# Patient Record
Sex: FEMALE | Race: WHITE | NOT HISPANIC OR LATINO | Employment: FULL TIME | URBAN - METROPOLITAN AREA
[De-identification: names, ages, dates, MRNs, and addresses within clinical notes are randomized per-mention and may not be internally consistent; named-entity substitution may affect disease eponyms.]

---

## 2021-04-06 ENCOUNTER — OFFICE VISIT (OUTPATIENT)
Dept: VASCULAR SURGERY | Facility: CLINIC | Age: 51
End: 2021-04-06
Payer: COMMERCIAL

## 2021-04-06 DIAGNOSIS — I78.1 TELANGIECTASIA: Primary | ICD-10-CM

## 2021-04-06 PROCEDURE — 99202 OFFICE O/P NEW SF 15 MIN: CPT | Performed by: SURGERY

## 2021-04-06 NOTE — LETTER
4/6/2021         RE: Smitha Hernandez  [address] [address]        Dear Colleague,    Thank you for referring your patient, Smitha Hernandez, to the [facility] [place]. Please see a copy of my visit note below.    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  She states the area on the left foot began swelling about 6 months ago and is slowly grown larger.  She does not recall any trauma or any specific injury to her foot that might of precipitated this.  She has not had anything similar to this in the past.    I last saw her in 2014 for sclerotherapy of telangiectasias and reticular veins of cosmetic concern.  She had excellent results and has done well since that time but returns now with recurrence.  She denies swelling of the legs, superficial thrombophlebitis, deep vein thrombosis.    PAST MEDICAL HISTORY: No past medical history on file.    PAST SURGICAL HISTORY: No past surgical history on file.    FAMILY HISTORY: No family history on file.    SOCIAL HISTORY:   Social History     Tobacco Use     Smoking status: Unknown If Ever Smoked   Substance Use Topics     Alcohol use: Yes       REVIEW OF SYSTEMS: Review Of Systems  Skin: negative  Eyes: negative  Ears/Nose/Throat: negative  Respiratory: No shortness of breath, dyspnea on exertion, cough, or hemoptysis  Cardiovascular: negative  Gastrointestinal: negative  Genitourinary: negative  Musculoskeletal: negative  Neurologic: negative  Psychiatric: negative  Hematologic/Lymphatic/Immunologic: Easy bruising  Endocrine: hot flashes      Vital signs:  There were no vitals taken for this visit.    Current Outpatient Medications   Medication Sig Dispense Refill     Sertraline HCl (ZOLOFT PO)  Take by mouth daily         PHYSICAL EXAM:  General: Pleasant, NAD.   HEENT: Normocephalic, atraumatic, external ears and nose normal.   Respiratory: Normal respiratory effort.   Cardiovascular: Pulse is regular.   Musculoskeletal: Gait and station normal.  The joints of her fingers and toes without deformity.  There is no cyanosis of her nailbeds.   EXTREMITIES: Right lower extremity: Scattered telangiectasias and reticular veins on the proximal anteromedial right calf.  No varicose veins.  There are few scattered telangiectasias elsewhere on her right leg.  No right ankle edema, stasis changes.  Left lower extremity: A few scattered telangiectasias and reticular veins.  No varicose veins.  No edema.  On the medial aspect of her left foot near the instep is a 1 cm diameter, raised, fixed subcutaneous mass with a 2 to 3 mm vein overlying it.  This has a suggestion of a fluid-filled cyst, possibly a ganglion cyst.  No erythema or other inflammation.    PULSES: R/L (3=normal pulse, 0=no palpable pulse) dorsalis pedis: 3/3; posterior tibial: 3/3.      Neurologic: Grossly normal  Psychiatric: Mood, affect, judgment and insight are normal     ASSESSMENT:  1.  Left medial foot mass, either a venous structure or a ganglion cyst.  We will check a left lower extremity ultrasound to ensure that there is no venous  insufficiency contributing to this mass.  2.  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  She understands that these are of little to no medical risk and that observation is a good option.  She wishes to have these treated for cosmetic purposes.    Details of cosmetic sclerotherapy including risks of allergic reaction, deep vein thrombosis, ulceration, hyperpigmentation and matting were discussed.  She voiced understanding and wishes to proceed.    PLAN:  1.  Left lower extremity venous competency study to include the left foot  2.  Bilateral lower extremity  cosmetic sclerotherapy, the first session under ultrasound guidance     Bhavik Lin MD    Dictated using Dragon voice recognition software which may result in transcription errors            Again, thank you for allowing me to participate in the care of your patient.        Sincerely,        Bhavik Lin MD

## 2021-04-06 NOTE — PROGRESS NOTES
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  She states the area on the left foot began swelling about 6 months ago and is slowly grown larger.  She does not recall any trauma or any specific injury to her foot that might of precipitated this.  She has not had anything similar to this in the past.    I last saw her in 2014 for sclerotherapy of telangiectasias and reticular veins of cosmetic concern.  She had excellent results and has done well since that time but returns now with recurrence.  She denies swelling of the legs, superficial thrombophlebitis, deep vein thrombosis.    PAST MEDICAL HISTORY: No past medical history on file.    PAST SURGICAL HISTORY: No past surgical history on file.    FAMILY HISTORY: No family history on file.    SOCIAL HISTORY:   Social History     Tobacco Use     Smoking status: Unknown If Ever Smoked   Substance Use Topics     Alcohol use: Yes       REVIEW OF SYSTEMS: Review Of Systems  Skin: negative  Eyes: negative  Ears/Nose/Throat: negative  Respiratory: No shortness of breath, dyspnea on exertion, cough, or hemoptysis  Cardiovascular: negative  Gastrointestinal: negative  Genitourinary: negative  Musculoskeletal: negative  Neurologic: negative  Psychiatric: negative  Hematologic/Lymphatic/Immunologic: Easy bruising  Endocrine: hot flashes      Vital signs:  There were no vitals taken for this visit.    Current Outpatient Medications   Medication Sig Dispense Refill     Sertraline HCl (ZOLOFT PO) Take by mouth daily         PHYSICAL EXAM:  General: Pleasant, NAD.   HEENT: Normocephalic, atraumatic, external ears and nose normal.   Respiratory: Normal respiratory effort.   Cardiovascular: Pulse is regular.   Musculoskeletal: Gait and station normal.  The  joints of her fingers and toes without deformity.  There is no cyanosis of her nailbeds.   EXTREMITIES: Right lower extremity: Scattered telangiectasias and reticular veins on the proximal anteromedial right calf.  No varicose veins.  There are few scattered telangiectasias elsewhere on her right leg.  No right ankle edema, stasis changes.  Left lower extremity: A few scattered telangiectasias and reticular veins.  No varicose veins.  No edema.  On the medial aspect of her left foot near the instep is a 1 cm diameter, raised, fixed subcutaneous mass with a 2 to 3 mm vein overlying it.  This has a suggestion of a fluid-filled cyst, possibly a ganglion cyst.  No erythema or other inflammation.    PULSES: R/L (3=normal pulse, 0=no palpable pulse) dorsalis pedis: 3/3; posterior tibial: 3/3.      Neurologic: Grossly normal  Psychiatric: Mood, affect, judgment and insight are normal     ASSESSMENT:  1.  Left medial foot mass, either a venous structure or a ganglion cyst.  We will check a left lower extremity ultrasound to ensure that there is no venous  insufficiency contributing to this mass.  2.  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  She understands that these are of little to no medical risk and that observation is a good option.  She wishes to have these treated for cosmetic purposes.    Details of cosmetic sclerotherapy including risks of allergic reaction, deep vein thrombosis, ulceration, hyperpigmentation and matting were discussed.  She voiced understanding and wishes to proceed.    PLAN:  1.  Left lower extremity venous competency study to include the left foot  2.  Bilateral lower extremity cosmetic sclerotherapy, the first session under ultrasound guidance     Bhavik Lin MD    Dictated using Dragon voice recognition software which may result in transcription errors

## 2021-04-06 NOTE — PROGRESS NOTES
After Visit Follow Up  Per Dr. Lin, patient was recommended to have 2 - 3 sessions at $400 of cosmetic sclerotherapy. 1st session Ultrasound Guided.    Patient in agreement with plan and had no further questions.    Emily Ward on 4/6/2021 at 12:12 PM

## 2021-04-19 ENCOUNTER — OFFICE VISIT (OUTPATIENT)
Dept: VASCULAR SURGERY | Facility: CLINIC | Age: 51
End: 2021-04-19
Payer: COMMERCIAL

## 2021-04-19 ENCOUNTER — ANCILLARY PROCEDURE (OUTPATIENT)
Dept: ULTRASOUND IMAGING | Facility: CLINIC | Age: 51
End: 2021-04-19
Attending: SURGERY
Payer: COMMERCIAL

## 2021-04-19 DIAGNOSIS — I78.1 TELANGIECTASIA: ICD-10-CM

## 2021-04-19 DIAGNOSIS — I78.1 SPIDER TELANGIECTASIS OF SKIN: Primary | ICD-10-CM

## 2021-04-19 DIAGNOSIS — I78.1 SPIDER VEINS: Primary | ICD-10-CM

## 2021-04-19 PROCEDURE — A6533 GC STOCKING THIGHLNGTH 18-30: HCPCS | Performed by: SURGERY

## 2021-04-19 PROCEDURE — 93971 EXTREMITY STUDY: CPT | Mod: LT | Performed by: SURGERY

## 2021-04-19 PROCEDURE — 99213 OFFICE O/P EST LOW 20 MIN: CPT | Performed by: SURGERY

## 2021-04-19 PROCEDURE — 99207 PR DROP WITH A PROCEDURE: CPT | Performed by: SURGERY

## 2021-04-19 PROCEDURE — 36468 NJX SCLRSNT SPIDER VEINS: CPT | Performed by: SURGERY

## 2021-04-19 PROCEDURE — 99207 PR NO CHARGE NURSE ONLY: CPT

## 2021-04-19 NOTE — PROGRESS NOTES
Patient purchased 1 pair(s) of beige, thigh high, open-toe, size 3 compression hose from the clinic today.     Informed patient all compression hose purchases are final.    Jennifer Fallon MA on 4/19/2021 at 8:16 AM

## 2021-04-19 NOTE — LETTER
4/19/2021         RE: Smitha Hernandez  [address] [address]        Dear Colleague,    Thank you for referring your patient, Smitha Hernandez, to the [facility] [place]. Please see a copy of my visit note below.    [facility] [place] Progress Note    Smitha Hernandez presents in follow-up of left lower extremity pain, primarily on her medial foot and the location of a mass and recurrent bilateral lower extremity reticular veins and telangiectasias.  Please see my consultation of 4/6/2021 for details.  She returned today for a left lower extremity venous competency study and discussion of results.    Physical Exam  General: Pleasant female in no acute distress.  Blood pressure 130/87, pulse 67  Extremities: Right lower extremity: Scattered reticular veins and telangiectasias especially over the right proximal medial and anteromedial leg.  Also intense telangiectasias are noted over the proximal posterior lateral right leg extending into the popliteal fossa and distal posterior lateral right thigh.  No significant varicose veins, edema or stasis changes.  Left lower extremity: Clusters of telangiectasias and reticular veins especially over the medial proximal calf, anterolateral left leg and near the left medial ankle.  There is a 15 to 18 mm mass on the left medial foot/instep which is noncompressible, nonecchymotic and nonerythematous.  It is slightly tender.  No edema or stasis changes of her ankle.    Ultrasound:  Right lower extremity: Normal phasicity, compression and augmentation with no evidence of deep vein thrombosis.  Left lower extremity: No evidence of deep vein thrombosis.  The left common femoral vein is incompetent but the remaining deep vein valves are competent.  Left great saphenous vein is incompetent from the saphenofemoral junction to the ankle, measures 5 mm in diameter distally with reflux time of 2.5  seconds.  There is a small varicosity coursing from the left distal great saphenous vein near the ankle.  There is in a nonvascular, noncompressible fluid-filled mass on the medial left foot/instep.    Assessment:  The mass of the left foot is not related to a vein.  This is cystic and evidence when I imaged this with ultrasound myself.  This may represent a ganglion cyst but is not a venous structure.  The mass is not related to her left great saphenous vein incompetence.    She is asymptomatic from the standpoint of her saphenous vein incompetence with no significant leg pain or varicose veins, therefore I feel the saphenous incompetence should be observed at this time.  If she develops symptoms of heaviness, achiness, tiredness or develop significant varicose veins, this could be treated.  We discussed the risk of observation with superficial thrombophlebitis, bleeding and progression of the disease process    I will recommend the patient see an orthopedic foot specialist, Dr. Matias to have this evaluated.    The patient may wish to have treatment of her telangiectasias and reticular veins for cosmetic purposes.    Plan:  1.  Orthopedic surgery evaluation for left medial foot mass  2.  Bilateral lower extremity cosmetic sclerotherapy    Bhavik Lin MD    Dictated using Dragon voice recognition software which may result in transcription errors      .    Vein Clinic Sclerotherapy Note     Indications:  Bilateral lower extremity reticular veins and spider telangiectasias of cosmetic concern     Procedure:  1.  Ultrasound-guided cosmetic sclerotherapy right proximal medial calf subcutaneous vein  2.  Bilateral lower extremity cosmetic sclerotherapy     Procedure description  Details of procedure including risks of allergic reaction, deep vein thrombosis, ulceration, superficial thrombophlebitis and hyperpigmentation were discussed.  The patient voiced understanding and wished to proceed.  Informed  consent was obtained.    The patient had a cluster of intense telangiectasias and reticular veins about the medial and anteromedial right proximal calf.  I imaged the area to see if there was a feeding varicosity and indeed there was a 3.5 mm vein coursing deep to this area.  I isolated this with ultrasound, directed a 27-gauge needle into it and injected 1 mL of 1% polidocanol foam (1 part polidocanol to 3 parts air mixture).  I then had the patient perform ankle pumps on the table.    I then donned the Syris headlight and injected telangiectasias and reticular veins from the distal third of the legs up to the mid thighs circumferentially.  The most intense clusters were on the anteromedial right calf and on the proximal posterior lateral right calf extending into the popliteal fossa.  We used a total of 7.5 mL of 0.5% polidocanol foam (1 part polidocanol to 2 parts air mixture).    I had the patient perform ankle pumps on the table.  We cleaned her legs, had her don thigh-high compression and then had her walk for 10 minutes prior to getting the car drive home.  She will return in 6 weeks in follow-up, no ultrasound guidance, 30-minute session    She tolerated procedure well without evidence of lower tract of complications.    Sclerotherapy    Date/Time: 4/19/2021 10:41 AM  Performed by: Bhavik Lin MD  Authorized by: Bhavik Lin MD     Time out: Immediately prior to the procedure a time out was called    Type:  Cosmetic  Session:  Full  Procedure side:  Bilateral  Solution/Amount:  1% POLIDOCANOL  Syringes::  1 syringe 1%; 3 syringes .5%  Patient tolerance:  Patient tolerated the procedure well with no immediate complications  Wrap/Hose:  Perry Lin MD    Dictated using Dragon voice recognition software which may result in transcription errors          Again, thank you for allowing me to participate in the care of your patient.        Sincerely,        Bhavik  Maribeth Lin MD

## 2022-06-01 ENCOUNTER — OFFICE VISIT (OUTPATIENT)
Dept: VASCULAR SURGERY | Facility: CLINIC | Age: 52
End: 2022-06-01

## 2022-06-01 DIAGNOSIS — I78.1 SPIDER TELANGIECTASIS OF SKIN: Primary | ICD-10-CM

## 2022-06-01 PROCEDURE — 36468 NJX SCLRSNT SPIDER VEINS: CPT | Performed by: SURGERY

## 2022-06-01 PROCEDURE — 99207 PR NO CHARGE NURSE ONLY: CPT

## 2022-06-01 PROCEDURE — S9999 SALES TAX: HCPCS | Performed by: SURGERY

## 2022-06-01 PROCEDURE — A6533 GC STOCKING THIGHLNGTH 18-30: HCPCS

## 2022-06-01 NOTE — PROGRESS NOTES
Patient purchased 1 pair(s) of natural, thigh high, closed-toe, size 3 compression hose from the clinic today.     Informed patient all compression hose purchases are final.    ZACARIAS Baxter on 6/1/2022 at 11:45 AM

## 2022-06-01 NOTE — LETTER
6/1/2022         RE: Smitha Hernandez  [address] [address]        Dear Colleague,    Thank you for referring your patient, Smitha Hernandez, to the [facility] [place]. Please see a copy of my visit note below.        Vein Clinic Sclerotherapy Note     Indications:  Recurrent, bilateral extremity spider telangiectasias and reticular veins of cosmetic concern     Procedure:  Bilateral lower extremity cosmetic sclerotherapy     Procedure description  Details of procedure including risks of allergic reaction, deep vein thrombosis, ulceration, superficial thrombophlebitis and hyperpigmentation were discussed.  The patient voiced understanding and wished to proceed.  Informed consent was obtained.    This patient was last in our office in April 2021 with concerns of a mass on the left foot/instep.  An ultrasound revealed this to be nonvascular.  She never had this further evaluated and is not complaining of pain.    She presents at this time with spider telangiectasias which are most proliferative over the medial right calf extending somewhat anteriorly.  Patient also has noted a small, slightly bulging vein on the right proximal medial calf.  She is not having significant pain.    We discussed the lower extremity vein anatomy and the possibility that she could have underlying right great saphenous vein incompetence as a contributing factor.  She is asymptomatic and does not wish to have an ultrasound to evaluate this at this time, H which with which I concur.  She does wish to have the telangiectasias and other veins treated for cosmetic purposes.    I donned the Syris headlight and injected telangiectasias circumferentially on both lower extremities using 0.5% polidocanol foam, total of 4 syringes (12 mL).   the small varicosity on the proximal medial calf is also injected.    I had the patient perform ankle pumps.  We cleaned her legs, had her don compression, then  had her walk for 10 minutes.  She will return in about 6 weeks in follow-up.      She is to have a possible right meniscectomy in about 3 weeks.  I do not think the treatment today would place her at significant increased risk for for thromboembolic complications with that surgery.    Sclerotherapy    Date/Time: 6/1/2022 1:18 PM  Performed by: Bhavik Lin MD  Authorized by: Bhavik Lin MD     Time out: Immediately prior to the procedure a time out was called    Type:  Cosmetic  Session:  Full  Procedure side:  Bilateral  Solution/Amount:  .5 POLIDOCANOL  Syringes:  4  Patient tolerance:  Patient tolerated the procedure well with no immediate complications  Wrap/Hose:  Eufemiae        ROMEO Lin MD    Dictated using Dragon voice recognition software which may result in transcription errors      Again, thank you for allowing me to participate in the care of your patient.        Sincerely,        Bhaivk Lin MD

## 2022-06-01 NOTE — PROGRESS NOTES
Vein Clinic Sclerotherapy Note     Indications:  Recurrent, bilateral extremity spider telangiectasias and reticular veins of cosmetic concern     Procedure:  Bilateral lower extremity cosmetic sclerotherapy     Procedure description  Details of procedure including risks of allergic reaction, deep vein thrombosis, ulceration, superficial thrombophlebitis and hyperpigmentation were discussed.  The patient voiced understanding and wished to proceed.  Informed consent was obtained.    This patient was last in our office in April 2021 with concerns of a mass on the left foot/instep.  An ultrasound revealed this to be nonvascular.  She never had this further evaluated and is not complaining of pain.    She presents at this time with spider telangiectasias which are most proliferative over the medial right calf extending somewhat anteriorly.  Patient also has noted a small, slightly bulging vein on the right proximal medial calf.  She is not having significant pain.    We discussed the lower extremity vein anatomy and the possibility that she could have underlying right great saphenous vein incompetence as a contributing factor.  She is asymptomatic and does not wish to have an ultrasound to evaluate this at this time, H which with which I concur.  She does wish to have the telangiectasias and other veins treated for cosmetic purposes.    I donned the Syris headlight and injected telangiectasias circumferentially on both lower extremities using 0.5% polidocanol foam, total of 4 syringes (12 mL).   the small varicosity on the proximal medial calf is also injected.    I had the patient perform ankle pumps.  We cleaned her legs, had her don compression, then had her walk for 10 minutes.  She will return in about 6 weeks in follow-up.      She is to have a possible right meniscectomy in about 3 weeks.  I do not think the treatment today would place her at significant increased risk for for thromboembolic complications  with that surgery.    Sclerotherapy    Date/Time: 6/1/2022 1:18 PM  Performed by: Bhavik Lin MD  Authorized by: Bhavik Lin MD     Time out: Immediately prior to the procedure a time out was called    Type:  Cosmetic  Session:  Full  Procedure side:  Bilateral  Solution/Amount:  .5 POLIDOCANOL  Syringes:  4  Patient tolerance:  Patient tolerated the procedure well with no immediate complications  Wrap/Hose:  Perry Lin MD    Dictated using Dragon voice recognition software which may result in transcription errors

## 2022-07-13 ENCOUNTER — TELEPHONE (OUTPATIENT)
Dept: VASCULAR SURGERY | Facility: CLINIC | Age: 52
End: 2022-07-13

## 2022-07-13 NOTE — TELEPHONE ENCOUNTER
LM for pt to give us a call back regarding the type of procedure she is having done.    Ginger Bourgeois RN  [facility]  Vein [facility]

## 2022-07-13 NOTE — TELEPHONE ENCOUNTER
Pt is having a non invasive procedure the next day following vein inj tx, pt wants to know if it is ok to have sclero done prior.

## 2022-08-16 ENCOUNTER — OFFICE VISIT (OUTPATIENT)
Dept: VASCULAR SURGERY | Facility: CLINIC | Age: 52
End: 2022-08-16

## 2022-08-16 DIAGNOSIS — I78.1 SPIDER TELANGIECTASIS OF SKIN: Primary | ICD-10-CM

## 2022-08-16 PROCEDURE — S9999 SALES TAX: HCPCS | Performed by: SURGERY

## 2022-08-16 PROCEDURE — 36468 NJX SCLRSNT SPIDER VEINS: CPT | Performed by: SURGERY

## 2022-08-16 NOTE — LETTER
8/16/2022         RE: Smitha Hernandez  [address] [facility] [address]        Dear Colleague,    Thank you for referring your patient, Smitha Hernandez, to the [facility] [place]. Please see a copy of my visit note below.        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  The patient voiced understanding and wished to proceed.  Informed consent was obtained.    The patient was last seen for cosmetic sclerotherapy on 6/1/2022.  She feels that she got fairly good results but has residual telangiectasias on the anterior right leg extending somewhat medially and laterally with fewer on the left anterior leg.  There is an area of trapped blood on the right proximal medial leg but no other areas of trapped blood were appreciated.    I donned the Syris headlight and treated these residual telangiectasias with 0.5% polidocanol foam using a total of 2 syringes (6 mL).  I had the patient perform ankle pumps.    I  then cleaned the area of trapped blood with alcohol, made a stab wound with an 18-gauge needle and expressed thrombus.  The patient tolerated this well and will return sometime in spring.      Sclerotherapy    Date/Time: 8/16/2022 2:36 PM  Performed by: Bhavik Lin MD  Authorized by: Bhavik Lin MD     Time out: Immediately prior to the procedure a time out was called    Type:  Cosmetic  Session:  Limited  Procedure side:  Bilateral  Solution/Amount:  .5 POLIDOCANOL  Syringes:  2  Evacuation of intraluminal thrombus under sterile conditions without  complications.    Patient tolerance:  Patient tolerated the procedure well with no immediate complications  Wrap/Hose:  Perry Lin MD    Dictated using Dragon voice recognition software which may result in transcription errors      Again, thank you for allowing me to participate in the care of your patient.        Sincerely,        Bhavik Lin MD

## 2022-08-16 NOTE — PROGRESS NOTES
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  The patient voiced understanding and wished to proceed.  Informed consent was obtained.    The patient was last seen for cosmetic sclerotherapy on 6/1/2022.  She feels that she got fairly good results but has residual telangiectasias on the anterior right leg extending somewhat medially and laterally with fewer on the left anterior leg.  There is an area of trapped blood on the right proximal medial leg but no other areas of trapped blood were appreciated.    I donned the Syris headlight and treated these residual telangiectasias with 0.5% polidocanol foam using a total of 2 syringes (6 mL).  I had the patient perform ankle pumps.    I  then cleaned the area of trapped blood with alcohol, made a stab wound with an 18-gauge needle and expressed thrombus.  The patient tolerated this well and will return sometime in spring.      Sclerotherapy    Date/Time: 8/16/2022 2:36 PM  Performed by: Bhavik Lin MD  Authorized by: Bhavik Lin MD     Time out: Immediately prior to the procedure a time out was called    Type:  Cosmetic  Session:  Limited  Procedure side:  Bilateral  Solution/Amount:  .5 POLIDOCANOL  Syringes:  2  Evacuation of intraluminal thrombus under sterile conditions without complications.    Patient tolerance:  Patient tolerated the procedure well with no immediate complications  Wrap/Hose:  Perry Lin MD    Dictated using Dragon voice recognition software which may result in transcription errors

## 2023-02-07 ENCOUNTER — ALLIED HEALTH/NURSE VISIT (OUTPATIENT)
Dept: VASCULAR SURGERY | Facility: CLINIC | Age: 53
End: 2023-02-07
Payer: COMMERCIAL

## 2023-02-07 ENCOUNTER — OFFICE VISIT (OUTPATIENT)
Dept: VASCULAR SURGERY | Facility: CLINIC | Age: 53
End: 2023-02-07
Payer: COMMERCIAL

## 2023-02-07 DIAGNOSIS — I78.1 SPIDER VEINS: Primary | ICD-10-CM

## 2023-02-07 DIAGNOSIS — I78.1 SPIDER TELANGIECTASIS OF SKIN: ICD-10-CM

## 2023-02-07 DIAGNOSIS — I78.1 SPIDER VEINS: ICD-10-CM

## 2023-02-07 DIAGNOSIS — I83.811 VARICOSE VEINS OF RIGHT LOWER EXTREMITY WITH PAIN: Primary | ICD-10-CM

## 2023-02-07 PROCEDURE — 36468 NJX SCLRSNT SPIDER VEINS: CPT | Performed by: SURGERY

## 2023-02-07 PROCEDURE — S9999 SALES TAX: HCPCS | Performed by: SURGERY

## 2023-02-07 PROCEDURE — 99207 PR NO CHARGE NURSE ONLY: CPT

## 2023-02-07 NOTE — PROGRESS NOTES
[facility] [place] office note  Smitha Hernandez returns in follow-up of 2 sessions of sclerotherapy, primarily of the right lower extremity.  She had a varicosity called her proximal, medial right calf and telangiectasias over the anteromedial right calf treated.    Since I have seen her she is complaining of numbness of her right foot and aching of her right lower extremity.  Additionally, the treated veins on the medial calf have not improved.  The pain is located in her foot and in her medial right leg.    Exam  Right lower extremity: Trace edema.  Reticular veins and somewhat matted telangiectasias over the infrapatellar area and over the anteromedial right calf.    Ultrasound  I interrogated the right lower extremity veins and note a sizable, immediate subcutaneous great saphenous vein from the mid to the distal thigh and into the proximal calf with tributaries coursing anterolaterally into the areas of the telangiectasias.  The great saphenous vein is quite generous in size and is likely incompetent.    Assessment  Worsening right leg pain with a dilated right great saphenous vein which is likely incompetent.  This could be contributing to the numbness of her foot and the pain in her right leg.  We discussed obtaining a formal ultrasound to assess for underlying valve incompetence, as a topic we have discussed at previous visits.  The leg is giving her more trouble with now and she would like to have this looked into.    Plan  Right lower extremity venous competency study with video visit to discuss results.    I will treat the varicosities coursing deep to the area of telangiectasias on the right leg with ultrasound-guided cosmetic sclerotherapy today.    ROMEO Lin MD, FACS

## 2023-02-07 NOTE — PROGRESS NOTES
Vein Clinic Sclerotherapy Note     Indications:  Right lower extremity spider telangiectasias of cosmetic concern     Procedure:  Right lower extremity cosmetic sclerotherapy, ultrasound-guided and direct vision     Procedure description  Details of procedure including risks of allergic reaction, deep vein thrombosis, ulceration, superficial thrombophlebitis and hyperpigmentation were discussed.  The patient voiced understanding and wished to proceed.  Informed consent was obtained.    I imaged the right medial calf with ultrasound and noted a trip.  Coursing from the below-knee great saphenous vein coursing laterally, deep to the area of matted telangiectasias.  This was isolated under ultrasound and a 27-gauge needle directed into it and 1 mL of 0.5% polidocanol foam was injected into it, milking it laterally and distally.    I then donned a Syris headlight and injected these telangiectasias under direct vision.  A total of approximately 4.5 mL of foam.    I had the patient perform ankle pumps.  We cleaned her leg, held on compression, then in a walk for 10 minutes.    She will return for right lower extremity venous competency study in the near future, the results which were discussed via a video visit      Solution/Amount:  .5 POLIDOCANOL  Syringes:  2  Patient tolerance:  Patient tolerated the procedure well with no immediate complications  Wrap/Hose:  Perry Lin MD    Dictated using Dragon voice recognition software which may result in transcription errors

## 2023-02-07 NOTE — LETTER
2/7/2023         RE: Smitha Hernandez  [address] [address]        Dear Colleague,    Thank you for referring your patient, Smitha Hernandez, to the [facility] [place]. Please see a copy of my visit note below.    [facility] [place] office note  Smitha Hernandez returns in follow-up of 2 sessions of sclerotherapy, primarily of the right lower extremity.  She had a varicosity called her proximal, medial right calf and telangiectasias over the anteromedial right calf treated.    Since I have seen her she is complaining of numbness of her right foot and aching of her right lower extremity.  Additionally, the treated veins on the medial calf have not improved.  The pain is located in her foot and in her medial right leg.    Exam  Right lower extremity: Trace edema.  Reticular veins and somewhat matted telangiectasias over the infrapatellar area and over the anteromedial right calf.    Ultrasound  I interrogated the right lower extremity veins and note a sizable, immediate subcutaneous great saphenous vein from the mid to the distal thigh and into the proximal calf with tributaries coursing anterolaterally into the areas of the telangiectasias.  The great saphenous vein is quite generous in size and is likely incompetent.    Assessment  Worsening right leg pain with a dilated right great saphenous vein which is likely incompetent.  This could be contributing to the numbness of her foot and the pain in her right leg.  We discussed obtaining a formal ultrasound to assess for underlying valve incompetence, as a topic we have discussed at previous visits.  The leg is giving her more trouble with now and she would like to have this looked into.    Plan  Right lower extremity venous competency study with video visit to discuss results.    I will treat the varicosities coursing deep to the area of telangiectasias on the right leg with ultrasound-guided cosmetic  sclerotherapy today.    ROMEO Lin MD, [facility]            Vein Clinic Sclerotherapy Note     Indications:  Right lower extremity spider telangiectasias of cosmetic concern     Procedure:  Right lower extremity cosmetic sclerotherapy, ultrasound-guided and direct vision     Procedure description  Details of procedure including risks of allergic reaction, deep vein thrombosis, ulceration, superficial thrombophlebitis and hyperpigmentation were discussed.  The patient voiced understanding and wished to proceed.  Informed consent was obtained.    I imaged the right medial calf with ultrasound and noted a trip.  Coursing from the below-knee great saphenous vein coursing laterally, deep to the area of matted telangiectasias.  This was isolated under ultrasound and a 27-gauge needle directed into it and 1 mL of 0.5% polidocanol foam was injected into it, milking it laterally and distally.    I then donned a Syris headlight and injected these telangiectasias under direct vision.  A total of approximately 4.5 mL of foam.    I had the patient perform ankle pumps.  We cleaned her leg, held on compression, then in a walk for 10 minutes.    She will return for biologic venous competency study in the near future, the results which were discussed via a video visit  Sclerotherapy    Date/Time: 2/7/2023 4:21 PM  Performed by: Bhavik Lin MD  Authorized by: Bhavik Lin MD     Time out: Immediately prior to the procedure a time out was called    Type:  Cosmetic  Session:  Limited  Procedure side:  Right  Solution/Amount:  .5 POLIDOCANOL  Syringes:  2  Patient tolerance:  Patient tolerated the procedure well with no immediate complications  Wrap/Hose:  Perry Lin MD    Dictated using Dragon voice recognition software which may result in transcription errors      Again, thank you for allowing me to participate in the care of your patient.        Sincerely,        Bhavik Stahl  MD [place]

## 2023-02-07 NOTE — PROGRESS NOTES
Patient purchased 1 pair(s) of natural, thigh high, closed-toe, size 3 compression hose from the clinic today.     Informed patient all compression hose purchases are final.    ZACARIAS Baxter on 2/7/2023 at 4:08 PM

## 2023-03-01 ENCOUNTER — ANCILLARY PROCEDURE (OUTPATIENT)
Dept: ULTRASOUND IMAGING | Facility: CLINIC | Age: 53
End: 2023-03-01
Attending: SURGERY
Payer: COMMERCIAL

## 2023-03-01 DIAGNOSIS — I83.811 VARICOSE VEINS OF RIGHT LOWER EXTREMITY WITH PAIN: ICD-10-CM

## 2023-03-01 PROCEDURE — 93971 EXTREMITY STUDY: CPT | Mod: RT | Performed by: SURGERY

## 2023-03-02 ENCOUNTER — VIRTUAL VISIT (OUTPATIENT)
Dept: VASCULAR SURGERY | Facility: CLINIC | Age: 53
End: 2023-03-02
Payer: COMMERCIAL

## 2023-03-02 DIAGNOSIS — I83.811 VARICOSE VEINS OF RIGHT LOWER EXTREMITY WITH PAIN: Primary | ICD-10-CM

## 2023-03-02 PROCEDURE — 99213 OFFICE O/P EST LOW 20 MIN: CPT | Mod: VID | Performed by: SURGERY

## 2023-03-02 NOTE — PROGRESS NOTES
Smitha is a 52 year old who is being evaluated via a billable video visit.      How would you like to obtain your AVS? MyChart  If the video visit is dropped, the invitation should be resent by: Text to cell phone: [phone]  Will anyone else be joining your video visit? No      Telephone-Visit Details    Type of service:  Telephone visit     Originating Location (pt. Location): Home    Distant Location (provider location):  On-site    Platform used for Video Visit: [employer] [place] telephone visit documentation  Smitha Hernandez had a right lower extremity venous competency study on 3/1/2023 because of recurrent right medial calf veins.  She is not describing significant pain at this time.    On 2/7/2023 I performed ultrasound-guided, cosmetic sclerotherapy of a right medial calf vein which had been noted on an ultrasound in June 2022.    The ultrasound today reveals incompetence of her right great saphenous vein from the proximal thigh to the mid calf.  The great saphenous vein becomes superficial at the mid thigh and measures only 3.3 mm in diameter maximally.  There are no significant, visible varicose veins coursing from it.    We also discussed the 1.4 x 0.9 x 1.8 cm vein versus fluid collection in her left medial foot that is of cosmetic concern.    Assessment  Asymptomatic right great saphenous vein incompetence.  I would recommend observation as the vein is quite small and is asymptomatic.  This could be treated with ultrasound-guided sclerotherapy but with risks of hyperpigmentation along the thigh and calf.    Plan  Observation of the right great saphenous vein incompetence.  The patient will return in follow-up of her last session of sclerotherapy at which time we will discuss options for treating the left medial foot mass.    ROMEO Lin MD, FACS    Dictated using Dragon voice recognition software which may result in transcription errors

## 2023-03-02 NOTE — LETTER
3/2/2023         RE: Smitha Hernandez  [address] [address]        Dear Colleague,    Thank you for referring your patient, Smitha Hernandez, to the [facility] [place]. Please see a copy of my visit note below.    Smitha is a 52 year old who is being evaluated via a billable video visit.      How would you like to obtain your AVS? MyChart  If the video visit is dropped, the invitation should be resent by: Text to cell phone: [phone]  Will anyone else be joining your video visit? No      Telephone-Visit Details    Type of service:  Telephone visit     Originating Location (pt. Location): Home    Distant Location (provider location):  On-site    Platform used for Video Visit: [employer]     [facility] telephone visit documentation  Smitha Hernandez had a right lower extremity venous competency study on 3/1/2023 because of recurrent right medial calf veins.  She is not describing significant pain at this time.    On 2/7/2023 I performed ultrasound-guided, cosmetic sclerotherapy of a right medial calf vein which had been noted on an ultrasound in June 2022.    The ultrasound today reveals incompetence of her right great saphenous vein from the proximal thigh to the mid calf.  The great saphenous vein becomes superficial at the mid thigh and measures only 3.3 mm in diameter maximally.  There are no significant, visible varicose veins coursing from it.    We also discussed the 1.4 x 0.9 x 1.8 cm vein versus fluid collection in her left medial foot that is of cosmetic concern.    Assessment  Asymptomatic right great saphenous vein incompetence.  I would recommend observation as the vein is quite small and is asymptomatic.  This could be treated with ultrasound-guided sclerotherapy but with risks of hyperpigmentation along the thigh and calf.    Plan  Observation of the right great saphenous vein incompetence.  The patient will return in follow-up of  her last session of sclerotherapy at which time we will discuss options for treating the left medial foot mass.    C Maribeth Lin MD, FACS    Dictated using Dragon voice recognition software which may result in transcription errors        Again, thank you for allowing me to participate in the care of your patient.        Sincerely,        Bhavik Lin MD

## 2023-03-13 ENCOUNTER — TRANSFERRED RECORDS (OUTPATIENT)
Dept: HEALTH INFORMATION MANAGEMENT | Facility: CLINIC | Age: 53
End: 2023-03-13
Payer: COMMERCIAL

## 2023-03-13 ENCOUNTER — MEDICAL CORRESPONDENCE (OUTPATIENT)
Dept: HEALTH INFORMATION MANAGEMENT | Facility: CLINIC | Age: 53
End: 2023-03-13
Payer: COMMERCIAL

## 2023-03-13 DIAGNOSIS — R07.9 CHEST PAIN: ICD-10-CM

## 2023-03-13 DIAGNOSIS — R94.31 ABNORMAL ELECTROCARDIOGRAM: Primary | ICD-10-CM

## 2023-03-14 DIAGNOSIS — I10 HTN (HYPERTENSION): ICD-10-CM

## 2023-03-14 DIAGNOSIS — R07.9 INTERMITTENT CHEST PAIN: ICD-10-CM

## 2023-03-14 DIAGNOSIS — R94.31 ABNORMAL ELECTROCARDIOGRAM: Primary | ICD-10-CM

## 2023-03-23 ENCOUNTER — HOSPITAL ENCOUNTER (OUTPATIENT)
Dept: CARDIOLOGY | Facility: CLINIC | Age: 53
Discharge: HOME | End: 2023-03-23
Attending: PHYSICIAN ASSISTANT | Admitting: PHYSICIAN ASSISTANT
Payer: COMMERCIAL

## 2023-03-23 DIAGNOSIS — R07.9 INTERMITTENT CHEST PAIN: ICD-10-CM

## 2023-03-23 DIAGNOSIS — I10 HTN (HYPERTENSION): ICD-10-CM

## 2023-03-23 DIAGNOSIS — R94.31 ABNORMAL ELECTROCARDIOGRAM: Primary | ICD-10-CM

## 2023-03-23 PROCEDURE — 93018 CV STRESS TEST I&R ONLY: CPT | Performed by: INTERNAL MEDICINE

## 2023-03-23 PROCEDURE — 999N000208 ECHO STRESS ECHOCARDIOGRAM

## 2023-03-23 PROCEDURE — 93350 STRESS TTE ONLY: CPT | Mod: 26 | Performed by: INTERNAL MEDICINE

## 2023-03-23 PROCEDURE — 93016 CV STRESS TEST SUPVJ ONLY: CPT | Performed by: INTERNAL MEDICINE

## 2023-03-23 PROCEDURE — 255N000002 HC RX 255 OP 636: Performed by: INTERNAL MEDICINE

## 2023-03-23 PROCEDURE — 93321 DOPPLER ECHO F-UP/LMTD STD: CPT | Mod: 26 | Performed by: INTERNAL MEDICINE

## 2023-03-23 PROCEDURE — 93325 DOPPLER ECHO COLOR FLOW MAPG: CPT | Mod: 26 | Performed by: INTERNAL MEDICINE

## 2023-03-23 PROCEDURE — 93321 DOPPLER ECHO F-UP/LMTD STD: CPT | Mod: TC

## 2023-03-23 RX ADMIN — HUMAN ALBUMIN MICROSPHERES AND PERFLUTREN 9 ML: 10; .22 INJECTION, SOLUTION INTRAVENOUS at 14:58

## 2023-03-26 ENCOUNTER — HEALTH MAINTENANCE LETTER (OUTPATIENT)
Age: 53
End: 2023-03-26

## 2023-03-27 ENCOUNTER — OFFICE VISIT (OUTPATIENT)
Dept: CARDIOLOGY | Facility: CLINIC | Age: 53
End: 2023-03-27
Payer: COMMERCIAL

## 2023-03-27 VITALS
BODY MASS INDEX: 23.64 KG/M2 | SYSTOLIC BLOOD PRESSURE: 150 MMHG | DIASTOLIC BLOOD PRESSURE: 94 MMHG | HEART RATE: 71 BPM | OXYGEN SATURATION: 100 % | HEIGHT: 68 IN | WEIGHT: 156 LBS

## 2023-03-27 DIAGNOSIS — R94.31 ABNORMAL ELECTROCARDIOGRAM: ICD-10-CM

## 2023-03-27 PROCEDURE — 99204 OFFICE O/P NEW MOD 45 MIN: CPT | Performed by: INTERNAL MEDICINE

## 2023-03-27 RX ORDER — METOPROLOL SUCCINATE 25 MG/1
25 TABLET, EXTENDED RELEASE ORAL DAILY
COMMUNITY
Start: 2023-03-24

## 2023-03-27 NOTE — LETTER
3/27/2023    Sita Sanchez Ed  [address]  [place] [address]    RE: Smitha Hernandez       Dear Colleague,     I had the pleasure of seeing Smitha Hernandez in the [facility].      Cardiology Clinic Progress Note      Today I had the pleasure of seeing Smitha Hernandez at [facility]. She is a pleasant 52 year old patient who presents the clinic in initial consultation.  The patient recently underwent stress echocardiogram for atypical chest pain.  Imaging portion of the test was negative for stress-induced ischemia.  The EKG portion of the test showed significantly high voltage in all the leads which is out of proportion to the LV wall thickness noted on the resting images.      She was also seen in the clinic in 2006 for similar issues and as per the notes she was noted to have LVH on EKG even back then.   She then underwent a stress test which was also negative for ischemia.  Unfortunately, I do not have the EKG from initial visit or from the stress test. She exercises daily regularly and vigorously and has never had any issues when doing so.  She has no family history of HCM, infiltrative disease.  Her first-degree relatives are healthy.  Other issues she has recently noticed is elevated blood pressure.  In the past her blood pressure has always been in the normal limits but recently has spiked into 140s and 150s.  She was started on 25 mg of metoprolol which she would like to stop.  She tells me that she checks her blood pressure every morning and it is always in the 110s.  She has recently been a lot of stress because of family reasons and wonders if that has played a big role in raising her blood pressure.  She also takes Adderall which she restarted after some time.    Assessment and plan:    1.  Atypical chest pain-negative stress echocardiogram.  Symptoms of not been occurring regularly and she is able to exert without much  difficulty.  We will continue with the current care  Monitor for now.  I do not believe there is any need to restrict her physical activity.    2.  Essential hypertension- on 25 mg of metoprolol.  Blood pressure in clinic is elevated although reported home blood pressure measurements have been within normal limits.  I have suggested continuing current dose of metoprolol for now.    3.  Significant LVH on EKG- voltage noted on EKG is out of proportion to the LV mass and wall thickness noted on the resting images of the stress echo.  I am not sure if this is a new finding because the prior notes from 2006 also mention LVH.  I will try to obtain those EKG tracings for comparison.  If there has been a significant change I I will consider performing a cardiac MRI to rule out other possibilities such as HCM.  I have discussed this in great detail with the patient and she is in agreement.  I will give her a call once I have the tracings with my updated recommendations.    All medical records were reviewed in detail and discussed with the patient. Greater than 30 mins were spent with the patient, 50% of this time was spent on counseling and coordination of care.  After visit summary was printed and given to the patient.    Thank you for allowing me to participate in the care of Smitha Hernandez    This note was completed in part using Dragon voice recognition software. Although reviewed after completion, some word and grammatical errors may occur.    Casey Cobos MD  Cardiology    No orders of the defined types were placed in this encounter.      Orders Placed This Encounter   Medications     metoprolol succinate ER (TOPROL XL) 25 MG 24 hr tablet     Sig: Take 25 mg by mouth daily Patient is currently taking 1.5 tab daily       There are no discontinued medications.    Encounter Diagnoses   Name Primary?     Abnormal electrocardiogram      Chest pain        CURRENT MEDICATIONS:  Current Outpatient Medications  "  Medication Sig Dispense Refill     metoprolol succinate ER (TOPROL XL) 25 MG 24 hr tablet Take 25 mg by mouth daily Patient is currently taking 1.5 tab daily         ALLERGIES     Allergies   Allergen Reactions     Sulfa Drugs Shortness Of Breath       PAST MEDICAL HISTORY:  History reviewed. No pertinent past medical history.    PAST SURGICAL HISTORY:  History reviewed. No pertinent surgical history.    FAMILY HISTORY:  Family History   Problem Relation Age of Onset     Coronary Artery Disease Mother      Bladder Cancer Mother      Coronary Artery Disease Father        SOCIAL HISTORY:  Social History     Socioeconomic History     Marital status:      Spouse name: None     Number of children: None     Years of education: None     Highest education level: None   Tobacco Use     Smoking status: Some Days     Types: Cigarettes     Tobacco comments:     Four cigs per week   Substance and Sexual Activity     Alcohol use: Yes     Comment: occ     Drug use: No       Review of Systems:  Skin:  not assessed       Eyes:  not assessed      ENT:  not assessed      Respiratory:  Negative shortness of breath;sleep apnea     Cardiovascular:  Negative;chest pain;edema;lightheadedness;fatigue palpitations;Positive for;dizziness occ  Gastroenterology: Negative      Genitourinary:  Negative      Musculoskeletal:  not assessed      Neurologic:  Positive for numbness or tingling of feet weak  Psychiatric:  not assessed      Heme/Lymph/Imm:  not assessed      Endocrine:  Negative thyroid disorder;diabetes      Physical Exam:  Vitals: BP (!) 150/94   Pulse 71   Ht 1.727 m (5' 8\")   Wt 70.8 kg (156 lb)   SpO2 100%   BMI 23.72 kg/m    Eyes: No icterus.  Pulmonary: Chest symmetric, lungs clear bilaterally and no crackles, wheezes or rales.  Cardiovascular: RRR with normal S1 and S2, no murmur, JVP normal.  Musculoskeletal: Edema of the lower extremities: None.  Neurologic: Oriented and appropriate without obvious focal " deficits.   Psychiatric: Normal affect.     Recent Lab Results:  LIPID RESULTS:  Lab Results   Component Value Date    TRIG Result 03/10/2023       LIVER ENZYME RESULTS:  Lab Results   Component Value Date    AST 26 06/25/2016    ALT 28 06/25/2016       CBC RESULTS:  Lab Results   Component Value Date    WBC 7.3 06/25/2016    RBC 4.45 06/25/2016    HGB 13.1 06/25/2016    HCT 38.2 06/25/2016    MCV 86 06/25/2016    MCH 29.4 06/25/2016    MCHC 34.3 06/25/2016    RDW 12.3 06/25/2016     06/25/2016       BMP RESULTS:  Lab Results   Component Value Date     06/25/2016    POTASSIUM 4.0 06/25/2016    CHLORIDE 104 03/06/2023    CHLORIDE 104 06/25/2016    CO2 28 06/25/2016    ANIONGAP 5 06/25/2016    GLC 85 06/25/2016    BUN 23 06/25/2016    CR 0.73 06/25/2016    GFRESTIMATED 85 06/25/2016    GFRESTBLACK >90   GFR Calc   06/25/2016    JOHN 8.9 06/25/2016        A1C RESULTS:  No results found for: A1C    INR RESULTS:  No results found for: INR    CC  DOMINIQUE GutierrezC  [address]  [place] [id number]    How are you  Back to let us was  It is okay      Thank you for allowing me to participate in the care of your patient.      Sincerely,     Casey Cobos MD      Heart Care

## 2023-03-27 NOTE — PROGRESS NOTES
Cardiology Clinic Progress Note      Today I had the pleasure of seeing Smitha Hernandez at [facility]. She is a pleasant 52 year old patient who presents the clinic in initial consultation.  The patient recently underwent stress echocardiogram for atypical chest pain.  Imaging portion of the test was negative for stress-induced ischemia.  The EKG portion of the test showed significantly high voltage in all the leads which is out of proportion to the LV wall thickness noted on the resting images.      She was also seen in the clinic in 2006 for similar issues and as per the notes she was noted to have LVH on EKG even back then.   She then underwent a stress test which was also negative for ischemia.  Unfortunately, I do not have the EKG from initial visit or from the stress test. She exercises daily regularly and vigorously and has never had any issues when doing so.  She has no family history of HCM, infiltrative disease.  Her first-degree relatives are healthy.  Other issues she has recently noticed is elevated blood pressure.  In the past her blood pressure has always been in the normal limits but recently has spiked into 140s and 150s.  She was started on 25 mg of metoprolol which she would like to stop.  She tells me that she checks her blood pressure every morning and it is always in the 110s.  She has recently been a lot of stress because of family reasons and wonders if that has played a big role in raising her blood pressure.  She also takes Adderall which she restarted after some time.    Assessment and plan:    1.  Atypical chest pain-negative stress echocardiogram.  Symptoms of not been occurring regularly and she is able to exert without much difficulty.  We will continue with the current care  Monitor for now.  I do not believe there is any need to restrict her physical activity.    2.  Essential hypertension- on 25 mg of metoprolol.  Blood pressure in clinic is  elevated although reported home blood pressure measurements have been within normal limits.  I have suggested continuing current dose of metoprolol for now.    3.  Significant LVH on EKG- voltage noted on EKG is out of proportion to the LV mass and wall thickness noted on the resting images of the stress echo.  I am not sure if this is a new finding because the prior notes from 2006 also mention LVH.  I will try to obtain those EKG tracings for comparison.  If there has been a significant change I I will consider performing a cardiac MRI to rule out other possibilities such as HCM.  I have discussed this in great detail with the patient and she is in agreement.  I will give her a call once I have the tracings with my updated recommendations.    All medical records were reviewed in detail and discussed with the patient. Greater than 30 mins were spent with the patient, 50% of this time was spent on counseling and coordination of care.  After visit summary was printed and given to the patient.    Thank you for allowing me to participate in the care of Smitha Hernandez    This note was completed in part using Dragon voice recognition software. Although reviewed after completion, some word and grammatical errors may occur.    Casey Cobos MD  Cardiology    No orders of the defined types were placed in this encounter.      Orders Placed This Encounter   Medications     metoprolol succinate ER (TOPROL XL) 25 MG 24 hr tablet     Sig: Take 25 mg by mouth daily Patient is currently taking 1.5 tab daily       There are no discontinued medications.    Encounter Diagnoses   Name Primary?     Abnormal electrocardiogram      Chest pain        CURRENT MEDICATIONS:  Current Outpatient Medications   Medication Sig Dispense Refill     metoprolol succinate ER (TOPROL XL) 25 MG 24 hr tablet Take 25 mg by mouth daily Patient is currently taking 1.5 tab daily         ALLERGIES     Allergies   Allergen Reactions     Sulfa Drugs  "Shortness Of Breath       PAST MEDICAL HISTORY:  History reviewed. No pertinent past medical history.    PAST SURGICAL HISTORY:  History reviewed. No pertinent surgical history.    FAMILY HISTORY:  Family History   Problem Relation Age of Onset     Coronary Artery Disease Mother      Bladder Cancer Mother      Coronary Artery Disease Father        SOCIAL HISTORY:  Social History     Socioeconomic History     Marital status:      Spouse name: None     Number of children: None     Years of education: None     Highest education level: None   Tobacco Use     Smoking status: Some Days     Types: Cigarettes     Tobacco comments:     Four cigs per week   Substance and Sexual Activity     Alcohol use: Yes     Comment: occ     Drug use: No       Review of Systems:  Skin:  not assessed       Eyes:  not assessed      ENT:  not assessed      Respiratory:  Negative shortness of breath;sleep apnea     Cardiovascular:  Negative;chest pain;edema;lightheadedness;fatigue palpitations;Positive for;dizziness occ  Gastroenterology: Negative      Genitourinary:  Negative      Musculoskeletal:  not assessed      Neurologic:  Positive for numbness or tingling of feet weak  Psychiatric:  not assessed      Heme/Lymph/Imm:  not assessed      Endocrine:  Negative thyroid disorder;diabetes      Physical Exam:  Vitals: BP (!) 150/94   Pulse 71   Ht 1.727 m (5' 8\")   Wt 70.8 kg (156 lb)   SpO2 100%   BMI 23.72 kg/m    Eyes: No icterus.  Pulmonary: Chest symmetric, lungs clear bilaterally and no crackles, wheezes or rales.  Cardiovascular: RRR with normal S1 and S2, no murmur, JVP normal.  Musculoskeletal: Edema of the lower extremities: None.  Neurologic: Oriented and appropriate without obvious focal deficits.   Psychiatric: Normal affect.     Recent Lab Results:  LIPID RESULTS:  Lab Results   Component Value Date    TRIG Result 03/10/2023       LIVER ENZYME RESULTS:  Lab Results   Component Value Date    AST 26 06/25/2016    ALT 28 " 06/25/2016       CBC RESULTS:  Lab Results   Component Value Date    WBC 7.3 06/25/2016    RBC 4.45 06/25/2016    HGB 13.1 06/25/2016    HCT 38.2 06/25/2016    MCV 86 06/25/2016    MCH 29.4 06/25/2016    MCHC 34.3 06/25/2016    RDW 12.3 06/25/2016     06/25/2016       BMP RESULTS:  Lab Results   Component Value Date     06/25/2016    POTASSIUM 4.0 06/25/2016    CHLORIDE 104 03/06/2023    CHLORIDE 104 06/25/2016    CO2 28 06/25/2016    ANIONGAP 5 06/25/2016    GLC 85 06/25/2016    BUN 23 06/25/2016    CR 0.73 06/25/2016    GFRESTIMATED 85 06/25/2016    GFRESTBLACK >90   GFR Calc   06/25/2016    JOHN 8.9 06/25/2016        A1C RESULTS:  No results found for: A1C    INR RESULTS:  No results found for: INR    CC  DOMINIQUE GutierrezC  [address]  [place] [id number]    How are you  Back to let us was  It is okay

## 2023-03-29 ENCOUNTER — TELEPHONE (OUTPATIENT)
Dept: CARDIOLOGY | Facility: CLINIC | Age: 53
End: 2023-03-29
Payer: COMMERCIAL

## 2023-03-29 NOTE — TELEPHONE ENCOUNTER
[facility] Call Center    Phone Message    May a detailed message be left on voicemail: yes     Reason for Call: Other: Pt called in wanting to speak with Dr. Cobos regrding new consultatin, and if any following up is needed. Please call pt back at [phone]     Action Taken: Other: Cardiology    Travel Screening: Not Applicable     Thank you!  [facility]

## 2023-03-29 NOTE — TELEPHONE ENCOUNTER
Left message at 1;15 to call back to discuss her concerns and questions-gave her the nurse line number.

## 2023-03-30 ENCOUNTER — TELEPHONE (OUTPATIENT)
Dept: CARDIOLOGY | Facility: CLINIC | Age: 53
End: 2023-03-30
Payer: COMMERCIAL

## 2023-03-30 DIAGNOSIS — R07.9 INTERMITTENT CHEST PAIN: ICD-10-CM

## 2023-03-30 DIAGNOSIS — I10 HYPERTENSION, UNSPECIFIED TYPE: ICD-10-CM

## 2023-03-30 DIAGNOSIS — R94.31 ABNORMAL ELECTROCARDIOGRAM: Primary | ICD-10-CM

## 2023-03-30 NOTE — TELEPHONE ENCOUNTER
"Received phone call from Smitha, asking about results from testing, and asking questions she has in follow up from her office visit with Dr. Cobos.    Smitha states that the side of face is still having some numbness, on and off.   States MR scan she had this week did not show anything new or problems.   Per PCP office notes, they believe this is related to a combination of virus and stress, which Smitha is aware.    Discussion with Dr. Cobos after he reviewed the stress Echo she just had, and the recent EKG findings of \"voltage criteria for LVH\", he would have liked to view the images from 2006.  Writer and colleagues have not been able to locate any images, only the written report, which indicated that left ventricle was of normal size and thickness.  Therefore, because there is still a question of actual diagnosis, Dr. Cobos would like to proceed with doing cardiac MRI.    Smitha is quite anxious, many many questions, and she wants to know the impact of \"this volatage thing and the \"hypertrophy\" on the rest of her life\".  Writer called her again and informed her of Dr. Cobos's preference to get better information, and be able to make a more definitive diagnosis.    Smitha would like to proceed with the cardiac MRI.  She states she is not claustrophobic, just had an MRI of the head related to the facial numbness.  She is aware schedulers will be calling her.      Orders placed for cardiac MRI, and follow up with MATTY visit.  If MRI is normal, then I told her she can cancel the follow up appointment.    Yumiko Nichols RN on 3/30/2023 at 3:38 PM        "

## 2023-04-24 ENCOUNTER — TELEPHONE (OUTPATIENT)
Dept: CARDIOLOGY | Facility: CLINIC | Age: 53
End: 2023-04-24

## 2023-04-24 NOTE — TELEPHONE ENCOUNTER
[facility] Call Center    Phone Message    May a detailed message be left on voicemail: yes     Reason for Call: Other:    Patient would like to cancel and r/s 4/25 appt. Please call patient to discuss.    Action Taken: Other: Cardiology     Travel Screening: Not Applicable     Thank you!  [facility]

## 2023-04-26 ENCOUNTER — TELEPHONE (OUTPATIENT)
Dept: CARDIOLOGY | Facility: CLINIC | Age: 53
End: 2023-04-26

## 2023-04-26 NOTE — TELEPHONE ENCOUNTER
[facility] Call Center    Phone Message    May a detailed message be left on voicemail: yes     Reason for Call: Other: Pt would like to reschedule MRI appts, please reach out to pt at [phone] to reschedule.     Action Taken: Other: Cardiology    Travel Screening: Not Applicable     Thank you!  [facility]

## 2023-06-01 ENCOUNTER — TELEPHONE (OUTPATIENT)
Dept: CARDIOLOGY | Facility: CLINIC | Age: 53
End: 2023-06-01

## 2023-06-01 NOTE — TELEPHONE ENCOUNTER
[facility] Call Center    Phone Message    May a detailed message be left on voicemail: yes     Reason for Call: Other:     Pt is requesting a call to [facility] tomorrow's MRI.  Please call work #[phone]    Action Taken: Other: cardio    Travel Screening: Not Applicable             Thank you!  [facility]

## 2023-06-07 ENCOUNTER — HOSPITAL ENCOUNTER (OUTPATIENT)
Dept: CARDIOLOGY | Facility: CLINIC | Age: 53
Discharge: HOME | End: 2023-06-07
Attending: INTERNAL MEDICINE | Admitting: INTERNAL MEDICINE
Payer: COMMERCIAL

## 2023-06-07 DIAGNOSIS — R07.9 INTERMITTENT CHEST PAIN: ICD-10-CM

## 2023-06-07 DIAGNOSIS — R94.31 ABNORMAL ELECTROCARDIOGRAM: ICD-10-CM

## 2023-06-07 DIAGNOSIS — I10 HYPERTENSION, UNSPECIFIED TYPE: ICD-10-CM

## 2023-06-07 PROCEDURE — 75561 CARDIAC MRI FOR MORPH W/DYE: CPT | Mod: 26 | Performed by: INTERNAL MEDICINE

## 2023-06-07 PROCEDURE — 255N000002 HC RX 255 OP 636: Performed by: INTERNAL MEDICINE

## 2023-06-07 PROCEDURE — 75561 CARDIAC MRI FOR MORPH W/DYE: CPT

## 2023-06-07 PROCEDURE — A9585 GADOBUTROL INJECTION: HCPCS | Performed by: INTERNAL MEDICINE

## 2023-06-07 RX ORDER — DIPHENHYDRAMINE HYDROCHLORIDE 50 MG/ML
25-50 INJECTION INTRAMUSCULAR; INTRAVENOUS
Status: DISCONTINUED | OUTPATIENT
Start: 2023-06-07 | End: 2023-06-08 | Stop reason: HOSPADM

## 2023-06-07 RX ORDER — GADOBUTROL 604.72 MG/ML
10 INJECTION INTRAVENOUS ONCE
Status: COMPLETED | OUTPATIENT
Start: 2023-06-07 | End: 2023-06-07

## 2023-06-07 RX ORDER — ONDANSETRON 2 MG/ML
4 INJECTION INTRAMUSCULAR; INTRAVENOUS
Status: DISCONTINUED | OUTPATIENT
Start: 2023-06-07 | End: 2023-06-08 | Stop reason: HOSPADM

## 2023-06-07 RX ORDER — DIPHENHYDRAMINE HCL 25 MG
25 CAPSULE ORAL
Status: DISCONTINUED | OUTPATIENT
Start: 2023-06-07 | End: 2023-06-08 | Stop reason: HOSPADM

## 2023-06-07 RX ORDER — ACYCLOVIR 200 MG/1
0-1 CAPSULE ORAL
Status: DISCONTINUED | OUTPATIENT
Start: 2023-06-07 | End: 2023-06-08 | Stop reason: HOSPADM

## 2023-06-07 RX ORDER — METHYLPREDNISOLONE SODIUM SUCCINATE 125 MG/2ML
125 INJECTION, POWDER, LYOPHILIZED, FOR SOLUTION INTRAMUSCULAR; INTRAVENOUS
Status: DISCONTINUED | OUTPATIENT
Start: 2023-06-07 | End: 2023-06-08 | Stop reason: HOSPADM

## 2023-06-07 RX ORDER — DIAZEPAM 5 MG
5 TABLET ORAL EVERY 30 MIN PRN
Status: DISCONTINUED | OUTPATIENT
Start: 2023-06-07 | End: 2023-06-08 | Stop reason: HOSPADM

## 2023-06-07 RX ADMIN — GADOBUTROL 10 ML: 604.72 INJECTION INTRAVENOUS at 15:50

## 2023-06-20 ENCOUNTER — OFFICE VISIT (OUTPATIENT)
Dept: CARDIOLOGY | Facility: CLINIC | Age: 53
End: 2023-06-20
Attending: INTERNAL MEDICINE
Payer: COMMERCIAL

## 2023-06-20 VITALS
BODY MASS INDEX: 22.73 KG/M2 | WEIGHT: 150 LBS | HEIGHT: 68 IN | OXYGEN SATURATION: 97 % | HEART RATE: 58 BPM | SYSTOLIC BLOOD PRESSURE: 134 MMHG | DIASTOLIC BLOOD PRESSURE: 82 MMHG

## 2023-06-20 DIAGNOSIS — R94.31 ABNORMAL ELECTROCARDIOGRAM: ICD-10-CM

## 2023-06-20 DIAGNOSIS — R07.9 INTERMITTENT CHEST PAIN: ICD-10-CM

## 2023-06-20 DIAGNOSIS — I10 HYPERTENSION, UNSPECIFIED TYPE: ICD-10-CM

## 2023-06-20 PROCEDURE — 99214 OFFICE O/P EST MOD 30 MIN: CPT | Performed by: PHYSICIAN ASSISTANT

## 2023-06-20 NOTE — LETTER
6/20/2023    Josette Salazar PA-C  [address]  [facility] [address]    RE: Smitha Hernandez       Dear Colleague,     I had the pleasure of seeing Smitha Hernandez in the [facility].  Primary Cardiologist: Dr. Cobos    Reason for Visit: review cMRI results    History of Present Illness:   Smitha is a pleasant 53-year-old female with past medical history notable for atypical chest pain, hypertension, and previous ECG suggesting possible LVH pattern.  She was referred to cardiology for further evaluation.  She underwent stress echocardiogram which was negative.  She was also started on metoprolol 25 mg daily for hypertension but she tells me that she has been under a great deal of stress and she wonders if her blood pressure was elevated because of this.  She recently underwent cardiac MRI for further evaluation of possible LVH and to rule out hypertrophic cardiomyopathy.  This showed preserved biventricular function with no LV wall thickness.  She had normal LV cavity size.    Smitha returns to clinic today, stating she is doing well.  She wonders if she can discontinue metoprolol and see if her blood pressure is under better control now that her stress level is lower.  She denies chest discomfort, shortness of breath, palpitations, orthopnea, or peripheral edema.      Assessment and Plan:  Smitha is a pleasant 53-year-old female with possible history of hypertension and recent history of atypical chest pain.    We have reviewed her cardiac MRI results in great detail today.  I reviewed the images and the report myself.  Her results were reassuring.  She does not have evidence of hypertrophic cardiomyopathy.    In regard to her metoprolol I have told her she is okay to try lowering the dose and discontinuing it.  She does have a blood pressure machine at home which she will use after she stops the medication to check her blood pressure regularly.  If the average systolic  blood pressure is 140 or higher she will contact her primary clinic for further recommendations.  She will follow-up with cardiology as needed going forward.    This note was completed in part using Dragon voice recognition software. Although reviewed after completion, some word and grammatical errors may occur.    Orders this Visit:  No orders of the defined types were placed in this encounter.    No orders of the defined types were placed in this encounter.    There are no discontinued medications.      Encounter Diagnoses   Name Primary?    Abnormal electrocardiogram     Hypertension, unspecified type     Intermittent chest pain        CURRENT MEDICATIONS:  Current Outpatient Medications   Medication Sig Dispense Refill    metoprolol succinate ER (TOPROL XL) 25 MG 24 hr tablet Take 25 mg by mouth daily Patient is currently taking 1.5 tab daily         ALLERGIES     Allergies   Allergen Reactions    Sulfa Antibiotics Shortness Of Breath       PAST MEDICAL HISTORY:  History reviewed. No pertinent past medical history.    PAST SURGICAL HISTORY:  History reviewed. No pertinent surgical history.    FAMILY HISTORY:  Family History   Problem Relation Age of Onset    Coronary Artery Disease Mother     Bladder Cancer Mother     Coronary Artery Disease Father        SOCIAL HISTORY:  Social History     Socioeconomic History    Marital status:      Spouse name: None    Number of children: None    Years of education: None    Highest education level: None   Tobacco Use    Smoking status: Some Days     Types: Cigarettes    Tobacco comments:     Four cigs per week   Substance and Sexual Activity    Alcohol use: Yes     Comment: occ    Drug use: No       Review of Systems:  Skin:  not assessed     Eyes:  not assessed    ENT:  not assessed    Respiratory:  Negative    Cardiovascular:  Negative;chest pain;edema;lightheadedness;fatigue palpitations;Positive for  Gastroenterology: Negative    Genitourinary:  Negative   "  Musculoskeletal:  not assessed    Neurologic:  Positive for numbness or tingling of feet  Psychiatric:  not assessed    Heme/Lymph/Imm:  not assessed    Endocrine:  Negative thyroid disorder;diabetes    Physical Exam:  Vitals: /82   Pulse 58   Ht 1.727 m (5' 8\")   Wt 68 kg (150 lb)   SpO2 97%   BMI 22.81 kg/m       GEN:  NAD  NECK: No JVD  C/V:  Regular rate and rhythm, no murmur, rub or gallop.  RESP: Clear to auscultation bilaterally without wheezing, rales, or rhonchi.  GI: Abdomen soft, nontender, nondistended. No HSM appreciated.   EXTREM: No pitting LE edema.   NEURO: Alert and oriented, cooperative. No obvious focal deficits.   PSYCH: Normal affect.  SKIN: Warm and dry.       Recent Lab Results:  LIPID RESULTS:  Lab Results   Component Value Date    TRIG Result 03/10/2023       LIVER ENZYME RESULTS:  Lab Results   Component Value Date    AST 26 06/25/2016    ALT 28 06/25/2016       CBC RESULTS:  Lab Results   Component Value Date    WBC 7.3 06/25/2016    RBC 4.45 06/25/2016    HGB 13.1 06/25/2016    HCT 38.2 06/25/2016    MCV 86 06/25/2016    MCH 29.4 06/25/2016    MCHC 34.3 06/25/2016    RDW 12.3 06/25/2016     06/25/2016       BMP RESULTS:  Lab Results   Component Value Date     06/25/2016    POTASSIUM 4.0 06/25/2016    CHLORIDE 104 03/06/2023    CHLORIDE 104 06/25/2016    CO2 28 06/25/2016    ANIONGAP 5 06/25/2016    GLC 85 06/25/2016    BUN 23 06/25/2016    CR 0.73 06/25/2016    GFRESTIMATED 85 06/25/2016    GFRESTBLACK >90   GFR Calc   06/25/2016    JOHN 8.9 06/25/2016        A1C RESULTS:  No results found for: A1C    INR RESULTS:  No results found for: INR        Coby De Leon PA-C  June 20, 2023         Thank you for allowing me to participate in the care of your patient.      Sincerely,     Coby De Leon PA-C     [facility]  cc:   Casey Cobos MD  [address]  [place]  [place] " [id number]

## 2023-06-20 NOTE — PROGRESS NOTES
Primary Cardiologist: Dr. Cobos    Reason for Visit: review cMRI results    History of Present Illness:   Smitha is a pleasant 53-year-old female with past medical history notable for atypical chest pain, hypertension, and previous ECG suggesting possible LVH pattern.  She was referred to cardiology for further evaluation.  She underwent stress echocardiogram which was negative.  She was also started on metoprolol 25 mg daily for hypertension but she tells me that she has been under a great deal of stress and she wonders if her blood pressure was elevated because of this.  She recently underwent cardiac MRI for further evaluation of possible LVH and to rule out hypertrophic cardiomyopathy.  This showed preserved biventricular function with no LV wall thickness.  She had normal LV cavity size.    Smitha returns to clinic today, stating she is doing well.  She wonders if she can discontinue metoprolol and see if her blood pressure is under better control now that her stress level is lower.  She denies chest discomfort, shortness of breath, palpitations, orthopnea, or peripheral edema.      Assessment and Plan:  Smitha is a pleasant 53-year-old female with possible history of hypertension and recent history of atypical chest pain.    We have reviewed her cardiac MRI results in great detail today.  I reviewed the images and the report myself.  Her results were reassuring.  She does not have evidence of hypertrophic cardiomyopathy.    In regard to her metoprolol I have told her she is okay to try lowering the dose and discontinuing it.  She does have a blood pressure machine at home which she will use after she stops the medication to check her blood pressure regularly.  If the average systolic blood pressure is 140 or higher she will contact her primary clinic for further recommendations.  She will follow-up with cardiology as needed going forward.    This note was completed in part using Dragon voice recognition  software. Although reviewed after completion, some word and grammatical errors may occur.    Orders this Visit:  No orders of the defined types were placed in this encounter.    No orders of the defined types were placed in this encounter.    There are no discontinued medications.      Encounter Diagnoses   Name Primary?     Abnormal electrocardiogram      Hypertension, unspecified type      Intermittent chest pain        CURRENT MEDICATIONS:  Current Outpatient Medications   Medication Sig Dispense Refill     metoprolol succinate ER (TOPROL XL) 25 MG 24 hr tablet Take 25 mg by mouth daily Patient is currently taking 1.5 tab daily         ALLERGIES     Allergies   Allergen Reactions     Sulfa Antibiotics Shortness Of Breath       PAST MEDICAL HISTORY:  History reviewed. No pertinent past medical history.    PAST SURGICAL HISTORY:  History reviewed. No pertinent surgical history.    FAMILY HISTORY:  Family History   Problem Relation Age of Onset     Coronary Artery Disease Mother      Bladder Cancer Mother      Coronary Artery Disease Father        SOCIAL HISTORY:  Social History     Socioeconomic History     Marital status:      Spouse name: None     Number of children: None     Years of education: None     Highest education level: None   Tobacco Use     Smoking status: Some Days     Types: Cigarettes     Tobacco comments:     Four cigs per week   Substance and Sexual Activity     Alcohol use: Yes     Comment: occ     Drug use: No       Review of Systems:  Skin:  not assessed     Eyes:  not assessed    ENT:  not assessed    Respiratory:  Negative    Cardiovascular:  Negative;chest pain;edema;lightheadedness;fatigue palpitations;Positive for  Gastroenterology: Negative    Genitourinary:  Negative    Musculoskeletal:  not assessed    Neurologic:  Positive for numbness or tingling of feet  Psychiatric:  not assessed    Heme/Lymph/Imm:  not assessed    Endocrine:  Negative thyroid disorder;diabetes    Physical  "Exam:  Vitals: /82   Pulse 58   Ht 1.727 m (5' 8\")   Wt 68 kg (150 lb)   SpO2 97%   BMI 22.81 kg/m       GEN:  NAD  NECK: No JVD  C/V:  Regular rate and rhythm, no murmur, rub or gallop.  RESP: Clear to auscultation bilaterally without wheezing, rales, or rhonchi.  GI: Abdomen soft, nontender, nondistended. No HSM appreciated.   EXTREM: No pitting LE edema.   NEURO: Alert and oriented, cooperative. No obvious focal deficits.   PSYCH: Normal affect.  SKIN: Warm and dry.       Recent Lab Results:  LIPID RESULTS:  Lab Results   Component Value Date    TRIG Result 03/10/2023       LIVER ENZYME RESULTS:  Lab Results   Component Value Date    AST 26 06/25/2016    ALT 28 06/25/2016       CBC RESULTS:  Lab Results   Component Value Date    WBC 7.3 06/25/2016    RBC 4.45 06/25/2016    HGB 13.1 06/25/2016    HCT 38.2 06/25/2016    MCV 86 06/25/2016    MCH 29.4 06/25/2016    MCHC 34.3 06/25/2016    RDW 12.3 06/25/2016     06/25/2016       BMP RESULTS:  Lab Results   Component Value Date     06/25/2016    POTASSIUM 4.0 06/25/2016    CHLORIDE 104 03/06/2023    CHLORIDE 104 06/25/2016    CO2 28 06/25/2016    ANIONGAP 5 06/25/2016    GLC 85 06/25/2016    BUN 23 06/25/2016    CR 0.73 06/25/2016    GFRESTIMATED 85 06/25/2016    GFRESTBLACK >90   GFR Calc   06/25/2016    JOHN 8.9 06/25/2016        A1C RESULTS:  No results found for: A1C    INR RESULTS:  No results found for: INR        Coby De Leon PA-C  June 20, 2023     "

## 2023-06-28 ENCOUNTER — TELEPHONE (OUTPATIENT)
Dept: VASCULAR SURGERY | Facility: CLINIC | Age: 53
End: 2023-06-28

## 2023-06-28 NOTE — TELEPHONE ENCOUNTER
"Called pt and LDVM in regards to her upcoming appt (pt missed today's appt 6/28/23 so then she rescheduled it with Mesha CHAPA, and was asking the  \"can everything be done at once during this appt\").    Per pt's last visit on 3/2/23 with Dr. Lin:      So the question would be if patient is having symptoms with her right leg, Dr. Lin would have to determine if medically necessary or cosmetic to treat with sclerotherapy. Otherwise, the plan was for pt to just follow up after her last session of sclerotherapy.    Gave pt our call back number to review the plan.    Ginger Bourgeois RN  [facility]  "

## 2023-07-06 NOTE — TELEPHONE ENCOUNTER
LDVM for pt, 2nd attempt, to clarify the plan for her appt next week - Friday July 14th with Dr. Lin.    Gave pt our call back number.    Ginger Bourgeois RN  [facility]  Vein [facility]

## 2023-07-19 ENCOUNTER — OFFICE VISIT (OUTPATIENT)
Dept: VASCULAR SURGERY | Facility: CLINIC | Age: 53
End: 2023-07-19
Payer: COMMERCIAL

## 2023-07-19 DIAGNOSIS — I78.1 SPIDER TELANGIECTASIS OF SKIN: Primary | ICD-10-CM

## 2023-07-19 PROCEDURE — 99213 OFFICE O/P EST LOW 20 MIN: CPT | Performed by: SURGERY

## 2023-07-19 NOTE — PROGRESS NOTES
[facility] [place] office note    Smitha Hernandez returns following cosmetic sclerotherapy of spider telangiectasias of cosmetic concern as well as reticular veins in the proximal anterior and anteromedial right calf.  We performed a treatment in February 2023 since which time she has had a right knee meniscectomy and is just getting back to full function from this.    Today she needed clarity around what options she had for treating residual, persistent right distal medial thigh, knee level and anteromedial calf telangiectasias which have been somewhat difficult to eradicate.    Right lower extremity venous competency study from 3/1/2023 revealed incompetence of the right great saphenous vein from the proximal thigh to the mid calf with the great saphenous vein becoming very superficial in the mid to distal thigh.  She currently is not complaining of significant pain in the right lower extremity.    Exam  Right lower extremity: Fine, purple and red telangiectasias along the mid to distal medial right thigh, medial to the right knee and extending onto the proximal anteromedial right leg.  The appearance of the right leg is improved following the treatment in February 2023.    I interrogated her right leg with ultrasound demonstrating to her the superficial nature of the right great saphenous vein and noting a tributary coursing from the right great saphenous vein in the mid to distal thigh that courses deep to the area of the telangiectasias along the medial thigh, medial knee and extending into the proximal calf.    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  She has an incompetent right great saphenous vein but currently is relatively asymptomatic, therefore I could not justify treating the great saphenous vein.  We discussed the option of sclerotherapy of the great saphenous vein but this would cause tremendous inflammation and likely result in hyperpigmentation  along the medial thigh.    Given the small vein coursing from the right thigh great saphenous vein that she has deep to the areas of telangiectasias, this may be contributing venous hypertension to these areas and, if treated, may help improve the appearance of these telangiectasias.    I do not think that this is the right time of year to perform the sclerotherapy as she will need to wear compression for about a week and will need to curtail her activities.  She did not express desire to do this at this time.  I demonstrated this to the patient on ultrasound and she voiced understanding.    Plan  Return sometime in the fall for ultrasound-guided, cosmetic sclerotherapy of the branch deep to the area of telangiectasias.  We will also perform direct vision sclerotherapy of these areas as well.  Full session charge, 45-minute session.    ROMEO Lin MD, FACS    Dictated using Dragon voice recognition software which may result in transcription errors

## 2023-07-19 NOTE — LETTER
7/19/2023         RE: Smitha Hernandez  [address] [id number]        Dear Colleague,    Thank you for referring your patient, Smitha Hernandez, to the [facility] [place]. Please see a copy of my visit note below.    [facility] [place] office note    Smitha Hernandez returns following cosmetic sclerotherapy of spider telangiectasias of cosmetic concern as well as reticular veins in the proximal anterior and anteromedial right calf.  We performed a treatment in February 2023 since which time she has had a right knee meniscectomy and is just getting back to full function from this.    Today she needed clarity around what options she had for treating residual, persistent right distal medial thigh, knee level and anteromedial calf telangiectasias which have been somewhat difficult to eradicate.    Right lower extremity venous competency study from 3/1/2023 revealed incompetence of the right great saphenous vein from the proximal thigh to the mid calf with the great saphenous vein becoming very superficial in the mid to distal thigh.  She currently is not complaining of significant pain in the right lower extremity.    Exam  Right lower extremity: Fine, purple and red telangiectasias along the mid to distal medial right thigh, medial to the right knee and extending onto the proximal anteromedial right leg.  The appearance of the right leg is improved following the treatment in February 2023.    I interrogated her right leg with ultrasound demonstrating to her the superficial nature of the right great saphenous vein and noting a tributary coursing from the right great saphenous vein in the mid to distal thigh that courses deep to the area of the telangiectasias along the medial thigh, medial knee and extending into the proximal calf.    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  She has an incompetent right great saphenous  vein but currently is relatively asymptomatic, therefore I could not justify treating the great saphenous vein.  We discussed the option of sclerotherapy of the great saphenous vein but this would cause tremendous inflammation and likely result in hyperpigmentation along the medial thigh.    Given the small vein coursing from the right thigh great saphenous vein that she has deep to the areas of telangiectasias, this may be contributing venous hypertension to these areas and, if treated, may help improve the appearance of these telangiectasias.    I do not think that this is the right time of year to perform the sclerotherapy as she will need to wear compression for about a week and will need to curtail her activities.  She did not express desire to do this at this time.  I demonstrated this to the patient on ultrasound and she voiced understanding.    Plan  Return sometime in the fall for ultrasound-guided, cosmetic sclerotherapy of the branch deep to the area of telangiectasias.  We will also perform direct vision sclerotherapy of these areas as well.  Full session charge, 45-minute session.    ROMEO Lin MD, FACS    Dictated using Dragon voice recognition software which may result in transcription errors          Again, thank you for allowing me to participate in the care of your patient.        Sincerely,        Bhavik Lin MD

## 2023-11-04 ENCOUNTER — HEALTH MAINTENANCE LETTER (OUTPATIENT)
Age: 53
End: 2023-11-04

## 2023-11-28 ENCOUNTER — OFFICE VISIT (OUTPATIENT)
Dept: VASCULAR SURGERY | Facility: CLINIC | Age: 53
End: 2023-11-28
Payer: COMMERCIAL

## 2023-11-28 DIAGNOSIS — I78.1 SPIDER TELANGIECTASIS OF SKIN: Primary | ICD-10-CM

## 2023-11-28 PROCEDURE — 36468 NJX SCLRSNT SPIDER VEINS: CPT | Performed by: SURGERY

## 2023-11-28 PROCEDURE — S9999 SALES TAX: HCPCS | Performed by: SURGERY

## 2023-11-28 NOTE — PROGRESS NOTES
Vein Clinic Sclerotherapy Note     Indications:  1.  Asymptomatic, right leg varicose vein of cosmetic concern  2.  Right greater than left lower extremity spider telangiectasias of cosmetic concern     Procedure:  1.  Ultrasound-guided sclerotherapy right proximal medial calf great saphenous vein varicosity  2.  Bilateral lower extremity direct vision cosmetic sclerotherapy     Procedure description  Details of procedure including risks of allergic reaction, deep vein thrombosis, ulceration, superficial thrombophlebitis and hyperpigmentation were discussed.  The patient voiced understanding and wished to proceed.  Informed consent was obtained.    I imaged the distal medial right thigh and proximal medial right calf noting that the great saphenous vein became superficial.  I tributary coursed from the proximal calf great saphenous vein coursing anteriorly and somewhat laterally, deep to the area of persistent telangiectasias on the proximal anteromedial right calf.  I isolated this vein with ultrasound and directed a 27-gauge needle into it injecting approximately 1.5 mL of 1% polidocanol foam filling the vein nicely.  There was 1 additional vein slightly more posterior left medial that I also isolated and injected.  I had the patient perform ankle pumps.    I then donned the Syris headlight and injected scattered telangiectasias over both lower extremities circumferentially.  The majority these were in the right proximal anteromedial leg.  I had the patient perform ankle pumps.    We used 1 syringe of 1% polidocanol foam and 1 syringe of 0.5% polidocanol foam.    Sclerotherapy    Date/Time: 11/28/2023 9:02 AM    Performed by: Bhavik Lin MD  Authorized by: Bhavik Lin MD    Time out: Immediately prior to the procedure a time out was called    Type:  Cosmetic  Session:  Full  Procedure side:  Right  Solution/Amount:  1% POLIDOCANOL and .5 POLIDOCANOL  Syringes:  1- 1%;  1-0.5%  Patient tolerance:  Patient tolerated the procedure well with no immediate complications  Wrap/Hose:  Perry Lin MD    Dictated using Dragon voice recognition software which may result in transcription errors

## 2023-11-28 NOTE — LETTER
11/28/2023         RE: Smitha Hernandez  [address] [id number]        Dear Colleague,    Thank you for referring your patient, Smitha Hernandez, to the [facility] [place]. Please see a copy of my visit note below.        Vein Clinic Sclerotherapy Note     Indications:  1.  Asymptomatic, right leg varicose vein of cosmetic concern  2.  Right greater than left lower extremity spider telangiectasias of cosmetic concern     Procedure:  1.  Ultrasound-guided sclerotherapy right proximal medial calf great saphenous vein varicosity  2.  Bilateral lower extremity direct vision cosmetic sclerotherapy     Procedure description  Details of procedure including risks of allergic reaction, deep vein thrombosis, ulceration, superficial thrombophlebitis and hyperpigmentation were discussed.  The patient voiced understanding and wished to proceed.  Informed consent was obtained.    I imaged the distal medial right thigh and proximal medial right calf noting that the great saphenous vein became superficial.  I tributary coursed from the proximal calf great saphenous vein coursing anteriorly and somewhat laterally, deep to the area of persistent telangiectasias on the proximal anteromedial right calf.  I isolated this vein with ultrasound and directed a 27-gauge needle into it injecting approximately 1.5 mL of 1% polidocanol foam filling the vein nicely.  There was 1 additional vein slightly more posterior left medial that I also isolated and injected.  I had the patient perform ankle pumps.    I then donned the Syris headlight and injected scattered telangiectasias over both lower extremities circumferentially.  The majority these were in the right proximal anteromedial leg.  I had the patient perform ankle pumps.    We used 1 syringe of 1% polidocanol foam and 1 syringe of 0.5% polidocanol foam.    Sclerotherapy    Date/Time: 11/28/2023 9:02 AM    Performed by: Bhavik Lin  MD Maribeth  Authorized by: Bhavik Lin MD    Time out: Immediately prior to the procedure a time out was called    Type:  Cosmetic  Session:  Full  Procedure side:  Right  Solution/Amount:  1% POLIDOCANOL and .5 POLIDOCANOL  Syringes:  1- 1%; 1-0.5%  Patient tolerance:  Patient tolerated the procedure well with no immediate complications  Wrap/Hose:  Hose      C Maribeth Lin MD    Dictated using Dragon voice recognition software which may result in transcription errors      Again, thank you for allowing me to participate in the care of your patient.        Sincerely,        Bhavik Lin MD

## 2023-12-21 ENCOUNTER — TELEPHONE (OUTPATIENT)
Dept: VASCULAR SURGERY | Facility: CLINIC | Age: 53
End: 2023-12-21
Payer: COMMERCIAL

## 2023-12-21 NOTE — TELEPHONE ENCOUNTER
Pt has last sclero done on 11-28-23 and now has developed a huge bump.  She is concerned of a DVT.  Please advise.

## 2023-12-21 NOTE — TELEPHONE ENCOUNTER
Called and LDVM for pt to give us a call back to discuss her symptoms.    Ginger Bourgeois RN  [facility]  Vein [facility]

## 2023-12-21 NOTE — TELEPHONE ENCOUNTER
Vein Clinic - Nurse Triage Call    Situation: Pt calling c/o a lump to the inside of her right calf not far below her knee.    Background: Pt had vein treatment: cosmetic U/S sclero of right leg and bilateral leg cosmetic sclero with Dr. Lin on 11/28/23.    Dr. Lin's sclerotherapy treatment note from 11/28/23:      Assessment: Pt is describing a hard lump to her right medial proximal calf which is where Dr. Lin injected a larger vein under ultrasound guidance. Pt denies leg swelling, redness, inflammation, or pain.     Pt stated she has had injections like this done before and never developed any hard lumps like this. However, she did note that Dr. Lin injected a larger vein on her right leg. Reminded pt he also used a stronger dose of medication (1% laura vs 0.5% laura) in that area of her leg.    Recommendation: Reassured pt very low risk of DVT with the ultrasound guided sclerotherapy and she is not complaining of any symptoms that would indicate anything more than the expected firmness and tenderness post-sclerotherapy, especially when larger veins are treated with U/S guidance. Informed pt it takes time (can take 3-6 months) for this firmness to resolve.    Reviewed postoperative information with patient. Patient is in agreement with plan and had no further questions.    Ginger Bourgeois RN  [facility]  Vein Clinic

## 2024-12-22 ENCOUNTER — HEALTH MAINTENANCE LETTER (OUTPATIENT)
Age: 54
End: 2024-12-22

## 2025-04-12 ENCOUNTER — HEALTH MAINTENANCE LETTER (OUTPATIENT)
Age: 55
End: 2025-04-12